# Patient Record
Sex: MALE | Race: ASIAN
[De-identification: names, ages, dates, MRNs, and addresses within clinical notes are randomized per-mention and may not be internally consistent; named-entity substitution may affect disease eponyms.]

---

## 2019-07-24 ENCOUNTER — HOSPITAL ENCOUNTER (EMERGENCY)
Dept: HOSPITAL 26 - MED | Age: 54
Discharge: LEFT BEFORE DISCHARGE | End: 2019-07-24
Payer: COMMERCIAL

## 2019-07-24 VITALS — HEIGHT: 63 IN | BODY MASS INDEX: 28.7 KG/M2 | WEIGHT: 162 LBS

## 2019-07-24 VITALS — DIASTOLIC BLOOD PRESSURE: 89 MMHG | SYSTOLIC BLOOD PRESSURE: 159 MMHG

## 2019-07-24 VITALS — SYSTOLIC BLOOD PRESSURE: 159 MMHG | DIASTOLIC BLOOD PRESSURE: 89 MMHG

## 2019-07-24 DIAGNOSIS — R53.1: Primary | ICD-10-CM

## 2019-07-24 DIAGNOSIS — Z53.21: ICD-10-CM

## 2019-07-24 NOTE — NUR
-------------------------------------------------------------------------------

            *** Note eunice in EDM - 07/24/19 at 1516 by [facility] ***             

-------------------------------------------------------------------------------

PATIENT COMING OUT OF ROOM STATING "I FEEL LIKE I SHOULD GO TO [place]." I 
ADVISED PATIENT PHYSICIAN HAS SEEN EKG AND WILL BE WITH HI SHORTLY. PATIENT DID 
NOT WANT TO WAIT. PATIENT LEFT WITHOUT BEING SEEN BY DR. HAIRSTON. NO FURTHER CARE 
PROVIDED FOR PATIENT.

## 2019-07-24 NOTE — NUR
PT STATES HE TOOK 10CC OF TRIMIX APPOXIMATELY 15 MINS AGO, STATES "I JUST 
WANTED TO TRY IT." PT STATES "I FEEL WEAK, I FEEL LIKE I'M HAVING A HEART 
ATTACK." DENIES CHEST PAIN, STATES "I JUST DON'T FEEL WELL". DENIES BLURRY 
VISION, CP, SOB, NUMBNESS OR TINGLING. RR EVEN AND UNLABORED. /89. [id number]. 
BED IS DOWN, LOCKED, BED RAIL X 1, ERMD TO SEE PT.



HX MI 2 YEARS AGO, WPW

## 2019-07-24 NOTE — NUR
PATIENT COMING OUT OF ROOM STATING "I FEEL LIKE I SHOULD GO TO [place]." I 
ADVISED PATIENT PHYSICIAN HAS SEEN EKG AND WILL BE WITH HIM SHORTLY. PATIENT 
DID NOT WANT TO WAIT. PATIENT LEFT WITHOUT BEING SEEN BY DR. HAIRSTON. NO FURTHER 
CARE PROVIDED FOR PATIENT.